# Patient Record
Sex: FEMALE | URBAN - METROPOLITAN AREA
[De-identification: names, ages, dates, MRNs, and addresses within clinical notes are randomized per-mention and may not be internally consistent; named-entity substitution may affect disease eponyms.]

---

## 2024-01-02 ENCOUNTER — TELEPHONE (OUTPATIENT)
Dept: ORTHOPEDIC SURGERY | Age: 44
End: 2024-01-02

## 2024-02-06 ENCOUNTER — OFFICE VISIT (OUTPATIENT)
Dept: ORTHOPEDIC SURGERY | Age: 44
End: 2024-02-06
Payer: COMMERCIAL

## 2024-02-06 DIAGNOSIS — M25.562 PAIN IN BOTH KNEES, UNSPECIFIED CHRONICITY: Primary | ICD-10-CM

## 2024-02-06 DIAGNOSIS — M25.561 PAIN IN BOTH KNEES, UNSPECIFIED CHRONICITY: Primary | ICD-10-CM

## 2024-02-06 PROCEDURE — 99204 OFFICE O/P NEW MOD 45 MIN: CPT | Performed by: ORTHOPAEDIC SURGERY

## 2024-02-06 RX ORDER — DICLOFENAC SODIUM 75 MG/1
75 TABLET, DELAYED RELEASE ORAL 2 TIMES DAILY
Qty: 60 TABLET | Refills: 1 | Status: SHIPPED | OUTPATIENT
Start: 2024-02-06

## 2024-02-06 NOTE — PROGRESS NOTES
Name: Lavonne Leavitt  YOB: 1980  Gender: female  MRN: [id number]      CC: Knee Pain (B)       HPI: Lavonne Leavitt is a 43 y.o. female who presents with Knee Pain (B)  . ***        ROS/Meds/PSH/PMH/FH/SH: I personally reviewed the patients standard intake form.  Below are the pertinents    Tobacco:  has no history on file for tobacco use.  Diabetes: {stw diabetes control:37346}  Other: ***    Physical Examination:  General: no acute distress  Lungs: breathing easily  CV: regular rhythm by pulse  {body part:51676}:***      Imaging:   Knee XR: 4 views     Clinical Indication  1. Pain in both knees, unspecified chronicity           Report: AP, lateral, PA flexion, sunrise views of the Bilateral knee demonstrates no acute fracture dislocation, or advanced degenerative changes    Impression: No acute findings as above            All imaging interpreted by myself Bennett Henley MD independent of radiology review    df    Assessment:     ICD-10-CM    1. Pain in both knees, unspecified chronicity  M25.561 XR Knee Bilateral Standard Extended VW    M25.562           Plan:   ***              Bennett Henley MD, FAAOS  Orthopaedics and Sports Medicine

## 2024-02-06 NOTE — PROGRESS NOTES
Name: Lavonne Leavitt  YOB: 1980  Gender: female  MRN: [id number]     CC: Knee Pain (B)     HPI: Lavonne Leavitt is a 43 y.o. female who presents with Left Knee pain.  History of an MCL sprain 15 years ago.  She improved with formal physical therapy.  She notes about 5 years ago her pain returned she was seen at this office and sent for an MRI of this left knee.  Did not get approved because she had not done the conservative management required to get the MRI.  After that point she did not proceed with physical therapy because she was worried this would mess her knee more.  She was dealing with the pain until November when the pain increased and she had some mechanical symptoms causing swelling and increased knee pain.  She notes no specific injury.  She improved with ice and NSAID therapy over about 2 to 3 weeks.  She notes no instability during this period of pain.  She has not had pain over the past couple weeks but did not want to cancel this appointment today.  She is here today for further evaluation.       ROS/Meds/PSH/PMH/FH/SH: I personally reviewed the patients standard intake form.  Below are the pertinents     Tobacco:  has no history on file for tobacco use.  Diabetes: none  Other: none     Physical Examination:  General: no acute distress  Lungs: breathing easily  CV: regular rhythm by pulse  Left Knee: No previous surgical scars present. No joint effusion present. Patella tracks centrally with no patellofemoral grind. Neutral mechanical alignment present. Passive ROM: 5 degrees of hyperextension with 130 degrees of flexion. Stable to varus and valgus stress at full extension and 30 degrees of flexion.  Negative Lachman's. Negative anterior drawer. Negative posterior drawer.  No tenderness of the medial joint today.  No tenderness along the MCL.  No pain with McMurrays over medial or lateral joint line. Calf is soft and nontender to palpation. Motor and sensory intact distally.

## 2024-03-04 NOTE — PROGRESS NOTES
[facility]  [address] [place] [address]  Dept: [phone]      Physical Therapy Initial Assessment     Insurance: Today is 1/5 visits/authorization required after 5th visit ([employer])    Total Timed Codes: 25 min, Total Treatment Time: 55 min Modifier needed: No      Referring MD: Bennett Henley MD  Referral for: Chronic L knee pain  Onset Date:  11/1/2023    Diagnosis:     ICD-10-CM    1. Chronic pain of left knee  M25.562     G89.29       2. Joint stiffness of left lower leg  M25.662       3. Left leg swelling  M79.89       4. Difficulty walking  R26.2       5. Muscle weakness  M62.81       6. Impaired mobility and ADLs  Z74.09     Z78.9              Therapy precautions:Latex allergy  Co-morbidities affecting plan of care: none per patient report      Episode visit count:  Visit count could not be calculated. Make sure you are using a visit which is associated with an episode.     PERTINENT MEDICAL HISTORY     Past medical and surgical history:   No past medical history on file.   No past surgical history on file.  Medications: reviewed in chart   Allergies: Not on File         SUBJECTIVE     Chief complaints/history of injury: Patient is 44 y/o female who presents to therapy with 15 year history of chronic left knee pain. She originally injured left knee playing soccer. She states left knee pain was manageable until 11/1/2023. She states she had exacerbation of symptoms in November. She c/o pain left knee, limited motion left knee, buckling/giving way and difficulty walking. She states she has weakness left LE. She has difficulty with getting in/out of bed and with work duties.         Received previous outpatient therapy? No    Pain Assessment:  Pain location: left knee    Average Pain/symptom intensity (0-10 scale)  Last 24 hours: 5/10  Last week (1-7 days): 8/10 at worst  How often do you feel symptoms? Constant (% of time)  Description: aching, burning, sharp, and

## 2024-03-05 ENCOUNTER — EVALUATION (OUTPATIENT)
Age: 44
End: 2024-03-05
Payer: COMMERCIAL

## 2024-03-05 DIAGNOSIS — M79.89 LEFT LEG SWELLING: ICD-10-CM

## 2024-03-05 DIAGNOSIS — Z78.9 IMPAIRED MOBILITY AND ADLS: ICD-10-CM

## 2024-03-05 DIAGNOSIS — M25.562 CHRONIC PAIN OF LEFT KNEE: Primary | ICD-10-CM

## 2024-03-05 DIAGNOSIS — R26.2 DIFFICULTY WALKING: ICD-10-CM

## 2024-03-05 DIAGNOSIS — Z74.09 IMPAIRED MOBILITY AND ADLS: ICD-10-CM

## 2024-03-05 DIAGNOSIS — G89.29 CHRONIC PAIN OF LEFT KNEE: Primary | ICD-10-CM

## 2024-03-05 DIAGNOSIS — M25.662 JOINT STIFFNESS OF LEFT LOWER LEG: ICD-10-CM

## 2024-03-05 DIAGNOSIS — M62.81 MUSCLE WEAKNESS: ICD-10-CM

## 2024-03-05 PROCEDURE — 97162 PT EVAL MOD COMPLEX 30 MIN: CPT | Performed by: PHYSICAL THERAPIST

## 2024-03-05 PROCEDURE — 97110 THERAPEUTIC EXERCISES: CPT | Performed by: PHYSICAL THERAPIST

## 2024-03-06 NOTE — PROGRESS NOTES
[facility]  [address] [place] [address]  Dept: [phone]      Physical Therapy Daily Note     Insurance: Today is 2/5 visits/authorization required after 5th visit ([employer])    Total Timed Codes: 40 min, Total Treatment Time: 40 min Modifier needed: No      Referring MD: Bennett Henley MD  Referral for: Chronic L knee pain  Onset Date:  11/1/2023    Diagnosis:     ICD-10-CM    1. Chronic pain of left knee  M25.562     G89.29       2. Joint stiffness of left lower leg  M25.662       3. Left leg swelling  M79.89       4. Difficulty walking  R26.2       5. Muscle weakness  M62.81       6. Impaired mobility and ADLs  Z74.09     Z78.9              Therapy precautions:Latex allergy  Co-morbidities affecting plan of care: none per patient report      SUBJECTIVE     Patient reports no increase left knee pain with exercises. She states she is achy all over from other health issues but that is not uncommon. She states she is able to do HEP with minimal difficulties.      Medications: no changes since last session  Patient is compliant with HEP    OBJECTIVE     Treatment provided today:  Therapeutic exercise (11131) x 40 min to develop ROM, strength, endurance and flexibility.  Included:   Quad sets 20x  SAQ 30x  LAQ 30x  Bridges 20x  SLR 10x2  Clams 30x    Seated HS stretch 1 minute  Step stretch for knee flexion 10x  Standing calf stretch on slantboard 1 minute    Standing hip 3 way with teal loop 20x  Side step with teal loop 2x  Sit to stand from 22\" seat 10x2    Patient Education on the condition/pathology, involved anatomy, and exercise rationale.    A/PROM Measures:      Right Left Comment   Knee Flexion 135A 125A    Knee Extension 0 0 L full extension                    ASSESSMENT     Patient has full left knee extension today. She has no pain with quad sets and full extension today. She has slight pain with sit to stand exercise. She is able to do HEP without increased left knee

## 2024-03-07 ENCOUNTER — TREATMENT (OUTPATIENT)
Age: 44
End: 2024-03-07

## 2024-03-07 DIAGNOSIS — Z74.09 IMPAIRED MOBILITY AND ADLS: ICD-10-CM

## 2024-03-07 DIAGNOSIS — Z78.9 IMPAIRED MOBILITY AND ADLS: ICD-10-CM

## 2024-03-07 DIAGNOSIS — M79.89 LEFT LEG SWELLING: ICD-10-CM

## 2024-03-07 DIAGNOSIS — M62.81 MUSCLE WEAKNESS: ICD-10-CM

## 2024-03-07 DIAGNOSIS — M25.662 JOINT STIFFNESS OF LEFT LOWER LEG: ICD-10-CM

## 2024-03-07 DIAGNOSIS — G89.29 CHRONIC PAIN OF LEFT KNEE: Primary | ICD-10-CM

## 2024-03-07 DIAGNOSIS — R26.2 DIFFICULTY WALKING: ICD-10-CM

## 2024-03-07 DIAGNOSIS — M25.562 CHRONIC PAIN OF LEFT KNEE: Primary | ICD-10-CM

## 2024-03-13 NOTE — PROGRESS NOTES
[facility]  [address] [place] [address]  Dept: [phone]      Physical Therapy Daily Note     Insurance: Today is 3/5 visits/authorization required after 5th visit ([employer])    Total Timed Codes: 50 min, Total Treatment Time: 50 min Modifier needed: No      Referring MD: Bennett eHnley MD  Referral for: Chronic L knee pain  Onset Date:  11/1/2023    Diagnosis:     ICD-10-CM    1. Chronic pain of left knee  M25.562     G89.29       2. Joint stiffness of left lower leg  M25.662       3. Left leg swelling  M79.89       4. Difficulty walking  R26.2       5. Muscle weakness  M62.81       6. Impaired mobility and ADLs  Z74.09     Z78.9              Therapy precautions:Latex allergy  Co-morbidities affecting plan of care: none per patient report      SUBJECTIVE     Patient reports no increase left knee pain with exercises. She left knee feels like it needs to pop today. When rolling over on treatment table, left knee popped. Left knee feels better after pop. No pain with exercises today.  Medications: no changes since last session  Patient is compliant with HEP    OBJECTIVE     Treatment provided today:  Therapeutic exercise (73986) x 50 min to develop ROM, strength, endurance and flexibility.  Included:   Stationary bike (seat with 2 holes showing) 5 minutes at L3    SAQ 30x with 2#  LAQ 30x with 2#  Bridges 30x with teal loop for abduction  Standing TKE with green band 20x  Clams 30x    Seated HS stretch 1 minute    Standing hip 3 way with teal loop 20x  Side step with teal loop 2x  Step up on 6\" step 20x    Patient Education on the condition/pathology, involved anatomy, and exercise rationale.    A/PROM Measures:      Right Left Comment   Knee Flexion 135A 125A    Knee Extension 0 0 L full extension                    ASSESSMENT     Patient has full left knee extension. She has no pain with exercises today. She has no left knee pain or difficulty with stepping up on 6\" step today.

## 2024-03-14 ENCOUNTER — TREATMENT (OUTPATIENT)
Age: 44
End: 2024-03-14

## 2024-03-14 DIAGNOSIS — G89.29 CHRONIC PAIN OF LEFT KNEE: Primary | ICD-10-CM

## 2024-03-14 DIAGNOSIS — M25.662 JOINT STIFFNESS OF LEFT LOWER LEG: ICD-10-CM

## 2024-03-14 DIAGNOSIS — M25.562 CHRONIC PAIN OF LEFT KNEE: Primary | ICD-10-CM

## 2024-03-14 DIAGNOSIS — M79.89 LEFT LEG SWELLING: ICD-10-CM

## 2024-03-14 DIAGNOSIS — M62.81 MUSCLE WEAKNESS: ICD-10-CM

## 2024-03-14 DIAGNOSIS — Z74.09 IMPAIRED MOBILITY AND ADLS: ICD-10-CM

## 2024-03-14 DIAGNOSIS — R26.2 DIFFICULTY WALKING: ICD-10-CM

## 2024-03-14 DIAGNOSIS — Z78.9 IMPAIRED MOBILITY AND ADLS: ICD-10-CM

## 2024-03-20 NOTE — PROGRESS NOTES
[facility]  [address] [place] [address]  Dept: [phone]      Physical Therapy Daily Note     Insurance: Today is 4/5 visits/authorization required after 5th visit ([employer])    Total Timed Codes: 60 min, Total Treatment Time: 60 min Modifier needed: No      Referring MD: Bennett Henley MD  Referral for: Chronic L knee pain  Onset Date:  11/1/2023    Diagnosis:     ICD-10-CM    1. Chronic pain of left knee  M25.562     G89.29       2. Joint stiffness of left lower leg  M25.662       3. Left leg swelling  M79.89       4. Difficulty walking  R26.2       5. Muscle weakness  M62.81       6. Impaired mobility and ADLs  Z74.09     Z78.9              Therapy precautions:Latex allergy  Co-morbidities affecting plan of care: none per patient report      SUBJECTIVE     Patient reports no increase left knee pain with exercises. She c/o 3/10 pain left knee today. She did a lot of walking and stairs at park yesterday. No pain with exercises today.      Medications: no changes since last session  Patient is compliant with HEP    OBJECTIVE     Treatment provided today:  Therapeutic exercise (47258) x 60 min to develop ROM, strength, endurance and flexibility.  Included:   Stationary bike (seat with 2 holes showing) 6 minutes at L4    Quad sets 20x  SAQ 30x with 2#  LAQ 30x with 2#  Bridges 30x with teal loop for abduction  Standing TKE with green band 20x  Clams 20x (alternating)    Seated HS stretch 1 minute    Standing hip 3 way with teal loop 30x  Side step with teal loop 2x  Step up on 6\" step 20x  Reciprocate stairs 5x    Patient Education on the condition/pathology, involved anatomy, and exercise rationale.    A/PROM Measures: 3/21/2024     Right Left Comment   Knee Flexion 135A 140A    Knee Extension 0 0 L full extension                    ASSESSMENT     Patient has full left knee extension. She has good AROM left knee today. She has no pain with exercises today. She has no left knee

## 2024-03-21 ENCOUNTER — TREATMENT (OUTPATIENT)
Age: 44
End: 2024-03-21

## 2024-03-21 DIAGNOSIS — M25.562 CHRONIC PAIN OF LEFT KNEE: Primary | ICD-10-CM

## 2024-03-21 DIAGNOSIS — M25.662 JOINT STIFFNESS OF LEFT LOWER LEG: ICD-10-CM

## 2024-03-21 DIAGNOSIS — M62.81 MUSCLE WEAKNESS: ICD-10-CM

## 2024-03-21 DIAGNOSIS — R26.2 DIFFICULTY WALKING: ICD-10-CM

## 2024-03-21 DIAGNOSIS — Z74.09 IMPAIRED MOBILITY AND ADLS: ICD-10-CM

## 2024-03-21 DIAGNOSIS — Z78.9 IMPAIRED MOBILITY AND ADLS: ICD-10-CM

## 2024-03-21 DIAGNOSIS — M79.89 LEFT LEG SWELLING: ICD-10-CM

## 2024-03-21 DIAGNOSIS — G89.29 CHRONIC PAIN OF LEFT KNEE: Primary | ICD-10-CM

## 2024-03-27 NOTE — PROGRESS NOTES
and exercise rationale.    A/PROM Measures: 3/28/2024     Right Left Comment   Knee Flexion 135A 140A    Knee Extension 0 0 L full extension                    Strength/MMT (0-5 Scale):       Right Left Comment   Knee Flexion         Knee Extension                   Hip Flexion         Hip Extension         Hip Abduction         Hip ER         Hip IR         Ankle DF         Ankle PF                          Special Tests/Function:   Gait: normal  Buckling left knee has decreased to infrequently   Stair management:step-to leading right ascending, step-to leading left descending    Functional Outcome Questionnaire: Lower Extremity Functional Scale: 59/80= 74% function     Pain Assessment:   Pain location: left knee    Average Pain/symptom intensity (0-10 scale)  Last 24 hours: 2/10  Last week (1-7 days): 4/10 at worst  How often do you feel symptoms? Constant (% of time)    ASSESSMENT     Patient has full left knee extension. She has good AROM left knee today. She has no pain with exercises today. She has no left knee pain or difficulty with stepping up on 9\" step today. She is able to reciprocate stairs in clinic today without difficulty. She is able to reciprocate stairs in community some days and not on other days. She is able to do HEP without increased left knee pain. She has less instability left knee with buckling left knee infrequently in gait. She has difficulty with prolonged standing and walking due to left knee pain and instability. She has normal gait into clinic. She is able to lift LE into bed and to lift left LE to dress without difficulty. She has improved strength left LE. She is unable to squat.   90% of ST goals met today.   PLAN     Progress note done 3/28/2924 (5th visit). Continue with strengthening and stretching exercises to improve function, improve gait and reduce painful symptoms. Progress exercises to patient tolerance. Use modalities as needed to reduce edema and painful symptoms

## 2024-03-28 ENCOUNTER — TREATMENT (OUTPATIENT)
Age: 44
End: 2024-03-28
Payer: COMMERCIAL

## 2024-03-28 DIAGNOSIS — M79.89 LEFT LEG SWELLING: ICD-10-CM

## 2024-03-28 DIAGNOSIS — G89.29 CHRONIC PAIN OF LEFT KNEE: Primary | ICD-10-CM

## 2024-03-28 DIAGNOSIS — M25.562 CHRONIC PAIN OF LEFT KNEE: Primary | ICD-10-CM

## 2024-03-28 DIAGNOSIS — R26.2 DIFFICULTY WALKING: ICD-10-CM

## 2024-03-28 DIAGNOSIS — Z74.09 IMPAIRED MOBILITY AND ADLS: ICD-10-CM

## 2024-03-28 DIAGNOSIS — M62.81 MUSCLE WEAKNESS: ICD-10-CM

## 2024-03-28 DIAGNOSIS — M25.662 JOINT STIFFNESS OF LEFT LOWER LEG: ICD-10-CM

## 2024-03-28 DIAGNOSIS — Z78.9 IMPAIRED MOBILITY AND ADLS: ICD-10-CM

## 2024-03-28 PROCEDURE — 97110 THERAPEUTIC EXERCISES: CPT | Performed by: PHYSICAL THERAPIST

## 2024-04-03 NOTE — PROGRESS NOTES
[facility]  [address] [place] [address]  Dept: [phone]      Physical Therapy Daily Note     Insurance: Today is 6/15 visits/visits  2024 ([employer])    Total Timed Codes: 45 min, Total Treatment Time: 45 min Modifier needed: No      Referring MD: Bennett Henley MD  Referral for: Chronic L knee pain  Onset Date:  2023    Diagnosis:     ICD-10-CM    1. Chronic pain of left knee  M25.562     G89.29       2. Joint stiffness of left lower leg  M25.662       3. Left leg swelling  M79.89       4. Difficulty walking  R26.2       5. Muscle weakness  M62.81              Therapy precautions:Latex allergy  Co-morbidities affecting plan of care: none per patient report      SUBJECTIVE     Patient reports no increase left knee pain with exercises. She reports 2/10 pain left knee today. She states left knee pain is from having to weight shift to left today due to right knee pain. Patient reports right knee pain today. She states right knee never hurts. It is always the left knee. She c/o medial and posterior right knee pain.       Medications: no changes since last session  Patient is compliant with HEP    OBJECTIVE     Treatment provided today:  Therapeutic exercise (88873) x 45 min to develop ROM, strength, endurance and flexibility. Exercises on bilateral LE s today.  Included:   Stationary bike (seat with 2 holes showing) 5 minutes at L2    SAQ 30x with 2#  LAQ 30x with 2#  Bridges 30x with teal loop for abduction   Standing TKE with green band 20x  SLR 20x  Clams 20x (alternating)    Seated HS stretch 1 minute    Standing hip 3 way with teal loop 30x each leg  Side step with teal loop 2x    Sit to stand from 22\" seat  20x    Patient Education on the condition/pathology, involved anatomy, and exercise rationale.    A/PROM Measures: 3/28/2024     Right Left Comment   Knee Flexion 135A 140A    Knee Extension 0 0 L full extension                      ASSESSMENT     Patient has

## 2024-04-04 ENCOUNTER — TREATMENT (OUTPATIENT)
Age: 44
End: 2024-04-04
Payer: COMMERCIAL

## 2024-04-04 DIAGNOSIS — M62.81 MUSCLE WEAKNESS: ICD-10-CM

## 2024-04-04 DIAGNOSIS — G89.29 CHRONIC PAIN OF LEFT KNEE: Primary | ICD-10-CM

## 2024-04-04 DIAGNOSIS — R26.2 DIFFICULTY WALKING: ICD-10-CM

## 2024-04-04 DIAGNOSIS — M79.89 LEFT LEG SWELLING: ICD-10-CM

## 2024-04-04 DIAGNOSIS — M25.662 JOINT STIFFNESS OF LEFT LOWER LEG: ICD-10-CM

## 2024-04-04 DIAGNOSIS — M25.562 CHRONIC PAIN OF LEFT KNEE: Primary | ICD-10-CM

## 2024-04-04 PROCEDURE — 97110 THERAPEUTIC EXERCISES: CPT | Performed by: PHYSICAL THERAPIST

## 2024-04-08 NOTE — PROGRESS NOTES
[facility]  [address] [place] [address]  Dept: [phone]      Physical Therapy Daily Note     Insurance: Today is 7/15 visits/visits  2024 ([employer])    Total Timed Codes: 45 min, Total Treatment Time: 45 min Modifier needed: No      Referring MD: Bennett Henley MD  Referral for: Chronic L knee pain  Onset Date:  2023    Diagnosis:     ICD-10-CM    1. Chronic pain of left knee  M25.562     G89.29       2. Joint stiffness of left lower leg  M25.662       3. Left leg swelling  M79.89       4. Difficulty walking  R26.2       5. Muscle weakness  M62.81       6. Impaired mobility and ADLs  Z74.09     Z78.9              Therapy precautions:Latex allergy  Co-morbidities affecting plan of care: none per patient report      SUBJECTIVE     Patient reports no pain right knee today. She states left knee pain is 410 today. She did walk down a hill yesterday at her son's [employer] practice. This made left knee sore. She states she did a lot of walking Saturday and left knee was irritated after prolonged walking.        Medications: no changes since last session  Patient is compliant with HEP    OBJECTIVE     Treatment provided today:  Therapeutic exercise (92536) x 45 min to develop ROM, strength, endurance and flexibility. Exercises on bilateral LE s today.  Included:   Stationary bike (seat with 2 holes showing) 5 minutes at L2    SAQ 30x with 2#  LAQ 30x with 2#  Bridges 30x with teal loop for abduction   Standing TKE with green band 20x  SLR 20x  Clams 20x (alternating)    Seated HS stretch 1 minute    Seated hip IR and ER with teal loop 20x    Standing hip 3 way with teal loop 30x each leg  Side step with teal loop 2x  Monster walk teal loop 2x        Patient Education on the condition/pathology, involved anatomy, and exercise rationale.    A/PROM Measures: 3/28/2024     Right Left Comment   Knee Flexion 135A 140A    Knee Extension 0 0 L full extension

## 2024-04-09 ENCOUNTER — TREATMENT (OUTPATIENT)
Age: 44
End: 2024-04-09
Payer: COMMERCIAL

## 2024-04-09 DIAGNOSIS — M25.562 CHRONIC PAIN OF LEFT KNEE: Primary | ICD-10-CM

## 2024-04-09 DIAGNOSIS — M25.662 JOINT STIFFNESS OF LEFT LOWER LEG: ICD-10-CM

## 2024-04-09 DIAGNOSIS — M62.81 MUSCLE WEAKNESS: ICD-10-CM

## 2024-04-09 DIAGNOSIS — G89.29 CHRONIC PAIN OF LEFT KNEE: Primary | ICD-10-CM

## 2024-04-09 DIAGNOSIS — Z78.9 IMPAIRED MOBILITY AND ADLS: ICD-10-CM

## 2024-04-09 DIAGNOSIS — Z74.09 IMPAIRED MOBILITY AND ADLS: ICD-10-CM

## 2024-04-09 DIAGNOSIS — M79.89 LEFT LEG SWELLING: ICD-10-CM

## 2024-04-09 DIAGNOSIS — R26.2 DIFFICULTY WALKING: ICD-10-CM

## 2024-04-09 PROCEDURE — 97110 THERAPEUTIC EXERCISES: CPT | Performed by: PHYSICAL THERAPIST

## 2024-04-16 ENCOUNTER — CLINICAL DOCUMENTATION (OUTPATIENT)
Age: 44
End: 2024-04-16

## 2024-04-16 ENCOUNTER — OFFICE VISIT (OUTPATIENT)
Dept: ORTHOPEDIC SURGERY | Age: 44
End: 2024-04-16
Payer: COMMERCIAL

## 2024-04-16 DIAGNOSIS — G89.29 CHRONIC PAIN OF LEFT KNEE: Primary | ICD-10-CM

## 2024-04-16 DIAGNOSIS — M25.562 CHRONIC PAIN OF LEFT KNEE: Primary | ICD-10-CM

## 2024-04-16 PROCEDURE — 99214 OFFICE O/P EST MOD 30 MIN: CPT | Performed by: ORTHOPAEDIC SURGERY

## 2024-04-16 NOTE — PROGRESS NOTES
Name: Lavonne Leavitt  YOB: 1980  Gender: female  MRN: [id number]      CC: Knee Pain (B)       HPI: Lavonne Leavitt is a 44 y.o. female who returns for follow up on  her bilateral knee pain. She states she felt good for about 6 weeks and after a therapy visit, she has had continued excruciating pain over the last two weeks in both knees.  Left side worse than the right.        Physical Examination:  General: no acute distress  Lungs: breathing easily  CV: regular rhythm by pulse  Left Knee: Tenderness palpation of the medial joint line trace effusion diffuse tenderness to palpation over the IT band and hamstring musculature and quad.  Pain with inversion of the medial joint line        Assessment:     ICD-10-CM    1. Chronic pain of left knee  M25.562 MRI KNEE LEFT WO CONTRAST    G89.29           Plan:   I think all of her symptoms are extra-articular.  She is flared again.  We are going to place her on a Medrol Dosepak for acute inflammatory symptoms.  We also proceed with an MRI scan to rule out internal derangement given her multiple recurrent flares of knee pain            Bennett Henley MD, FAAOS  Orthopaedics and Sports Medicine

## 2024-04-16 NOTE — PROGRESS NOTES
Patient saw MD today. He has ordered MRI left knee. She states she needs to hold therapy for now until results of MRI are in. She cancelled her appt for Thursday.

## 2024-04-17 RX ORDER — METHYLPREDNISOLONE 4 MG/1
TABLET ORAL
Qty: 1 KIT | Refills: 0 | Status: SHIPPED | OUTPATIENT
Start: 2024-04-17

## 2024-04-30 ENCOUNTER — OFFICE VISIT (OUTPATIENT)
Dept: ORTHOPEDIC SURGERY | Age: 44
End: 2024-04-30
Payer: COMMERCIAL

## 2024-04-30 DIAGNOSIS — M17.12 ARTHRITIS OF LEFT KNEE: Primary | ICD-10-CM

## 2024-04-30 PROCEDURE — 99213 OFFICE O/P EST LOW 20 MIN: CPT | Performed by: ORTHOPAEDIC SURGERY

## 2024-04-30 NOTE — PROGRESS NOTES
Name: Lavonne Leavitt  YOB: 1980  Gender: female  MRN: [id number]      CC: Knee Pain (L)       HPI: Lavonne Leavitt is a 44 y.o. female who returns for follow up on her left knee.         Physical Examination:  General: no acute distress  Lungs: breathing easily  CV: regular rhythm by pulse  Left Knee: Tenderness palpation of the medial joint line pain at the extremes of motion of the medial joint line negative Lavonne's.    Imaging: I reviewed MRI scan of her left knee which demonstrates some moderate degenerative changes the medial compartment with subchondral cystic formation of the medial tibial plateau mild thinning of the patellofemoral and lateral compartment.    Assessment:     ICD-10-CM    1. Arthritis of left knee  M17.12           Plan:   I think the majority of her symptoms are medial compartment degenerative changes with subchondral cystic changes.  We discussed management of arthritis with anti-inflammatory medication potential cortisone and viscosupplementation as well as potentially a medial  brace.  I do think some physical therapy and quad strengthening and nonimpact exercise will help her which we encouraged her on.  Currently she is managing her symptoms pretty well and would like to continue her home exercise program.  She can follow-up with us as needed if she think she needs an injection            Bennett Henley MD, FAAOS  Orthopaedics and Sports Medicine

## 2024-05-07 ENCOUNTER — HOSPITAL ENCOUNTER (OUTPATIENT)
Dept: GENERAL RADIOLOGY | Age: 44
Discharge: HOME | End: 2024-05-10

## 2024-05-07 DIAGNOSIS — T14.90XA INJURY: ICD-10-CM

## 2024-05-07 PROCEDURE — 73610 X-RAY EXAM OF ANKLE: CPT

## 2024-05-10 ENCOUNTER — CLINICAL DOCUMENTATION (OUTPATIENT)
Age: 44
End: 2024-05-10

## 2024-05-10 NOTE — PROGRESS NOTES
Physical Therapy DC summary  Patient returned to MD due to increased left knee pain. She had MRI. MD discussed options with patient. At this time, patient will continue with strengthening exercises at home. She is independent with HEP. She is discharged from PT.

## 2024-07-02 ENCOUNTER — OFFICE VISIT (OUTPATIENT)
Dept: ORTHOPEDIC SURGERY | Age: 44
End: 2024-07-02
Payer: COMMERCIAL

## 2024-07-02 DIAGNOSIS — M17.12 ARTHRITIS OF LEFT KNEE: Primary | ICD-10-CM

## 2024-07-02 PROCEDURE — 20610 DRAIN/INJ JOINT/BURSA W/O US: CPT | Performed by: ORTHOPAEDIC SURGERY

## 2024-07-02 RX ORDER — TRIAMCINOLONE ACETONIDE 40 MG/ML
40 INJECTION, SUSPENSION INTRA-ARTICULAR; INTRAMUSCULAR ONCE
Status: COMPLETED | OUTPATIENT
Start: 2024-07-02 | End: 2024-07-02

## 2024-07-02 RX ADMIN — TRIAMCINOLONE ACETONIDE 40 MG: 40 INJECTION, SUSPENSION INTRA-ARTICULAR; INTRAMUSCULAR at 10:45

## 2024-07-02 NOTE — PROGRESS NOTES
Name: Lavonne Leavitt  YOB: 1980  Gender: female  MRN: [id number]      CC: Knee Pain (L)     HPI: Lavonne Leavitt is a 44 y.o. female who returns for follow up on her left knee. 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         Physical Examination:  General: no acute distress  Lungs: breathing easily  CV: regular rhythm by pulse  Left Knee: Tenderness palpation medial lateral joint line full passive and active range of motion        Assessment:     ICD-10-CM    1. Arthritis of left knee  M17.12 triamcinolone acetonide (KENALOG-40) injection 40 mg     DRAIN/INJECT LARGE JOINT/BURSA          Plan:   Continues to have knee pain we discussed pros and cons of intra-articular injection which she elected to proceed with  Before beginning this procedure, the procedure was explained in detail. The patient's name and  were confirmed. The body part and laterality were identified and agreed upon by the patient and myself.  The patient elected to proceed with an intraarticular knee injection today.  After verbal informed consent was obtained after sterile prep the Left knee  was injected from a anterior lateral approach with 4 cc of 0.25% Marcaine and 1 cc of 40 mg Kenalog.  The patient tolerated the procedure well was given postinjection flare precautions.             Bennett Henley MD, FAAOS  Orthopaedics and Sports Medicine